# Patient Record
Sex: FEMALE | Race: BLACK OR AFRICAN AMERICAN | Employment: STUDENT | URBAN - METROPOLITAN AREA
[De-identification: names, ages, dates, MRNs, and addresses within clinical notes are randomized per-mention and may not be internally consistent; named-entity substitution may affect disease eponyms.]

---

## 2020-06-27 ENCOUNTER — HOSPITAL ENCOUNTER (EMERGENCY)
Age: 20
Discharge: HOME | End: 2020-06-27
Attending: STUDENT IN AN ORGANIZED HEALTH CARE EDUCATION/TRAINING PROGRAM
Payer: COMMERCIAL

## 2020-06-27 VITALS
HEART RATE: 92 BPM | SYSTOLIC BLOOD PRESSURE: 128 MMHG | RESPIRATION RATE: 18 BRPM | DIASTOLIC BLOOD PRESSURE: 81 MMHG | WEIGHT: 185.41 LBS | OXYGEN SATURATION: 100 % | TEMPERATURE: 98.5 F

## 2020-06-27 DIAGNOSIS — U07.1 COVID-19 VIRUS INFECTION: Primary | ICD-10-CM

## 2020-06-27 PROCEDURE — 99282 EMERGENCY DEPT VISIT SF MDM: CPT

## 2020-06-27 RX ORDER — NORETHINDRONE ACETATE AND ETHINYL ESTRADIOL 1; .02 MG/1; MG/1
1 TABLET ORAL DAILY
COMMUNITY
End: 2020-09-16 | Stop reason: SDUPTHER

## 2020-06-28 NOTE — ED PROVIDER NOTES
Patient is a 60-year-old female presenting to the emergency department today with shortness of breath. She states that she started with symptoms about 10 days ago which included headache, flulike symptoms, loss of taste and smell and fevers. She been using home remedies for treatment. She started with shortness of breath several days ago which is been about the same, may be getting a little bit worse especially with exertion. She was tested for COVID-19 earlier this week and it came back positive. She has not had a fever in 3 days. [facility] felt her blood pressure was rising and her heart rate was dropping (states it was in the 70s). While there they told her that she should have a pulse between 95 and 100 and she decided to come here to get checked out. She states her pulseox was never low. While at rest  she feels well. She has no underlying medical conditions. History reviewed. No pertinent past medical history. Past Surgical History:   Procedure Laterality Date    HX WISDOM TEETH EXTRACTION           History reviewed. No pertinent family history.     Social History     Socioeconomic History    Marital status: SINGLE     Spouse name: Not on file    Number of children: Not on file    Years of education: Not on file    Highest education level: Not on file   Occupational History    Not on file   Social Needs    Financial resource strain: Not on file    Food insecurity     Worry: Not on file     Inability: Not on file    Transportation needs     Medical: Not on file     Non-medical: Not on file   Tobacco Use    Smoking status: Never Smoker    Smokeless tobacco: Never Used   Substance and Sexual Activity    Alcohol use: Never     Frequency: Never    Drug use: Yes     Types: Marijuana     Comment: 1 x per month    Sexual activity: Yes     Partners: Male     Birth control/protection: Pill   Lifestyle    Physical activity     Days per week: Not on file     Minutes per session: Not on file    Stress: Not on file   Relationships    Social connections     Talks on phone: Not on file     Gets together: Not on file     Attends [employer] service: Not on file     Active member of club or organization: Not on file     Attends meetings of clubs or organizations: Not on file     Relationship status: Not on file    Intimate partner violence     Fear of current or ex partner: Not on file     Emotionally abused: Not on file     Physically abused: Not on file     Forced sexual activity: Not on file   Other Topics Concern    Not on file   Social History Narrative    Not on file         ALLERGIES: Patient has no known allergies. Review of Systems   Constitutional: Positive for chills and fever. HENT: Negative for congestion and rhinorrhea. Eyes: Negative for redness and visual disturbance. Respiratory: Positive for cough and shortness of breath. Cardiovascular: Negative for chest pain and leg swelling. Gastrointestinal: Positive for nausea and vomiting. Negative for abdominal pain and diarrhea. Genitourinary: Negative for dysuria, flank pain, frequency, hematuria and urgency. Musculoskeletal: Positive for myalgias. Negative for arthralgias, back pain and neck pain. Skin: Negative for rash and wound. Allergic/Immunologic: Negative for immunocompromised state. Neurological: Positive for headaches. Negative for dizziness. Vitals:    06/27/20 2027 06/27/20 2055   BP: 128/81    Pulse: 92    Resp: 18    Temp: 98.5 °F (36.9 °C)    SpO2: 100% 100%   Weight: 84.1 kg (185 lb 6.5 oz)             Physical Exam  Vitals signs and nursing note reviewed. Constitutional:       General: She is not in acute distress. Appearance: She is well-developed. She is not diaphoretic. HENT:      Head: Normocephalic. Mouth/Throat:      Pharynx: No oropharyngeal exudate. Eyes:      General:         Right eye: No discharge.          Left eye: No discharge. Pupils: Pupils are equal, round, and reactive to light. Neck:      Musculoskeletal: Normal range of motion and neck supple. Cardiovascular:      Rate and Rhythm: Normal rate and regular rhythm. Heart sounds: Normal heart sounds. No murmur. No friction rub. No gallop. Pulmonary:      Effort: Pulmonary effort is normal. No respiratory distress. Breath sounds: Normal breath sounds. No stridor. No wheezing or rales. Abdominal:      General: Bowel sounds are normal. There is no distension. Palpations: Abdomen is soft. Tenderness: There is no abdominal tenderness. There is no guarding or rebound. Musculoskeletal: Normal range of motion. General: No deformity. Skin:     General: Skin is warm and dry. Capillary Refill: Capillary refill takes less than 2 seconds. Findings: No rash. Neurological:      Mental Status: She is alert and oriented to person, place, and time. Psychiatric:         Behavior: Behavior normal.          MDM:  26-year-old female with known COVID-19 [facility] that she should have a pulse between 95 and 100. I suspect that they meant a pulse ox rather than heart rate. Here her pulse ox is 100% on room air. I ambulated the patient with her pulse ox on and it never dropped below 99%. Her heart rate is in the 80s and 90s and she is in no acute distress. Her lungs are clear and she is afebrile here without having taken antipyretics. She is very well-appearing and in no acute distress. I feel she is safe for discharge home and strict return precautions were provided both in writing and verbally. ICD-10-CM ICD-9-CM    1. COVID-19 virus infection U07.1 079.89      DO Porfirio Bonilla Aaron was evaluated in the Emergency Department on 6/27/2020 for the symptoms described in the history of present illness.  He/she was evaluated in the context of the global COVID-19 pandemic, which necessitated consideration that the patient might be at risk for infection with the SARS-CoV-2 virus that causes COVID-19. Institutional protocols and algorithms that pertain to the evaluation of patients at risk for COVID-19 are in a state of rapid change based on information released by regulatory bodies including the CDC and federal and state organizations. These policies and algorithms were followed during the patient's care in the ED.     Surrogate Decision Maker (Who do you want to make decisions for you in the event you are not able to?): Extended Emergency Contact Information  Primary Emergency Contact: [facility] Phone: [phone]  Mobile Phone: [phone]  Relation: Parent

## 2020-06-28 NOTE — ED NOTES
Patient awake, alert, and in no distress. Discharge instructions and education given to patient. Verbalized understanding of discharge instructions. Patient walked out of ED with RN who then provided ED instructions to pt's parents who were waiting outside fore her, pt's oarents verbalized understanding. Alex Cardoso

## 2020-06-28 NOTE — ED TRIAGE NOTES
Triage Note: + COVID results on Thursday, tested on Monday, symptoms started on June 17th, symptoms include migraine, lose of smell, fever up to 100.4, chills, nausea, diarrhea, burning sensation in nose, SOB that began Thursday with it worse today, no meds PTA

## 2020-06-29 ENCOUNTER — PATIENT OUTREACH (OUTPATIENT)
Dept: FAMILY MEDICINE CLINIC | Age: 20
End: 2020-06-29

## 2020-09-01 PROBLEM — Z00.00 ROUTINE GENERAL MEDICAL EXAMINATION AT A HEALTH CARE FACILITY: Status: ACTIVE | Noted: 2020-09-01

## 2020-09-16 ENCOUNTER — OFFICE VISIT (OUTPATIENT)
Dept: FAMILY MEDICINE CLINIC | Age: 20
End: 2020-09-16
Payer: COMMERCIAL

## 2020-09-16 VITALS
TEMPERATURE: 97.8 F | WEIGHT: 194.8 LBS | SYSTOLIC BLOOD PRESSURE: 128 MMHG | RESPIRATION RATE: 18 BRPM | DIASTOLIC BLOOD PRESSURE: 86 MMHG | BODY MASS INDEX: 27.89 KG/M2 | OXYGEN SATURATION: 97 % | HEART RATE: 86 BPM | HEIGHT: 70 IN

## 2020-09-16 DIAGNOSIS — Z00.00 ROUTINE GENERAL MEDICAL EXAMINATION AT A HEALTH CARE FACILITY: Primary | ICD-10-CM

## 2020-09-16 DIAGNOSIS — R06.02 SOB (SHORTNESS OF BREATH): ICD-10-CM

## 2020-09-16 DIAGNOSIS — Z86.16 HISTORY OF 2019 NOVEL CORONAVIRUS DISEASE (COVID-19): ICD-10-CM

## 2020-09-16 DIAGNOSIS — Z30.41: ICD-10-CM

## 2020-09-16 DIAGNOSIS — Z13.220 SCREENING, LIPID: ICD-10-CM

## 2020-09-16 PROCEDURE — 99385 PREV VISIT NEW AGE 18-39: CPT | Performed by: FAMILY MEDICINE

## 2020-09-16 RX ORDER — NORETHINDRONE ACETATE AND ETHINYL ESTRADIOL 1; .02 MG/1; MG/1
1 TABLET ORAL DAILY
Qty: 3 PACKAGE | Refills: 3 | Status: SHIPPED | OUTPATIENT
Start: 2020-09-16 | End: 2020-09-29 | Stop reason: DRUGHIGH

## 2020-09-16 NOTE — PROGRESS NOTES
Chief Complaint   Patient presents with    New Patient     New patient getting established with doctor. 1. Have you been to the ER, urgent care clinic since your last visit? Hospitalized since your last visit? No    2. Have you seen or consulted any other health care providers outside of the [address] [place] [address] since your last visit? Include any pap smears or colon screening.  No

## 2020-09-16 NOTE — PROGRESS NOTES
Subjective:   21 y.o. female for Well Woman Check. Her gyne and breast care is done elsewhere by her Ob-Gyne physician. (Dr. Kelli Barone. Seen by GYN on last month). Hx of COVID19 in June. Had respiratory sx with cough and SOB. Also had fever and chills and muscle pain and nausea. Sx lasted about 1 week. No one else in her family had the infection. Pt this not sure where she had the exposure. Lives with her parent. Pt is an only child. Pt attends [employer]. 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 Patient Active Problem List   Diagnosis Code    Routine general medical examination at a health care facility Z00.00       Current Outpatient Medications   Medication Sig Dispense Refill    norethindrone-ethinyl estradiol (Junel 1/20, 21,) 1-20 mg-mcg tablet Take 1 Tab by mouth daily. 3 Package 3       No Known Allergies    History reviewed. No pertinent past medical history. Past Surgical History:   Procedure Laterality Date    HX WISDOM TEETH EXTRACTION         Family History   Problem Relation Age of Onset    Hypertension Mother     Hypertension Father     Diabetes Paternal Grandmother         pre-diabetes    MS Paternal Grandmother        Social History     Tobacco Use    Smoking status: Never Smoker    Smokeless tobacco: Never Used   Substance Use Topics    Alcohol use: Never     Frequency: Never       ROS: Feeling generally well. No TIA's or unusual headaches, no dysphagia. No prolonged cough. No dyspnea or chest pain on exertion. No abdominal pain, change in bowel habits, black or bloody stools. No urinary tract symptoms. No new or unusual musculoskeletal symptoms. Immunizations UTD per pt. Specific concerns today: still feels like she gets more SOB with exercise since she has had covid19 infection. She does not had cough. No chest pain noted. Objective: The patient appears well, alert, oriented x 3, in no distress.     Visit Vitals  /86   Pulse 86   Temp 97.8 °F (36.6 °C) (Temporal)   Resp 18   Ht 5' 9.5\" (1.765 m)   Wt 194 lb 12.8 oz (88.4 kg)   LMP 08/30/2020   SpO2 97%   BMI 28.35 kg/m²     ENT normal.  Neck supple. No adenopathy or thyromegaly. LORENA. Lungs are clear, good air entry, no wheezes, rhonchi or rales. S1 and S2 normal, no murmurs, regular rate and rhythm. Abdomen soft without tenderness, guarding, mass or organomegaly. Extremities show no edema, normal peripheral pulses. Neurological is normal, no focal findings. Breast and Pelvic exams are deferred. Assessment/Plan:   Well Woman  routine labs ordered, call if any problems  Diagnoses and all orders for this visit:    1. Routine general medical examination at a health care facility  Pt will provide records for her immunization.    -     CBC W/O DIFF  -     METABOLIC PANEL, BASIC    2. History of 2019 novel coronavirus disease (COVID-19)  -     XR CHEST PA LAT; Future  -     SARS-COV-2 AB, TOTAL    3. SOB (shortness of breath)  -     XR CHEST PA LAT; Future    4. Screening, lipid  -     LIPID PANEL    5. Provided repeat prescription for oral contraceptive  -     norethindrone-ethinyl estradiol (Junel 1/20, 21,) 1-20 mg-mcg tablet; Take 1 Tab by mouth daily. reviewed diet, exercise and weight control  cardiovascular risk and specific lipid/LDL goals reviewed    I have discussed diagnosis listed in this note with pt and/or family. I have discussed treatment plans and options and the risk/benefit analysis of those options, including safe use of medications and possible medication side effects. Through the use of shared decision making we have agreed to the above plan. The patient has received an after-visit summary and questions were answered concerning future plans and follow up. Advise pt of any urgent changes then to proceed to the ER.

## 2020-09-16 NOTE — PATIENT INSTRUCTIONS
COVID-19 Antibody Test: About This Test 
What is it? An antibody test looks for antibodies in the blood. These are proteins that your immune system makes, usually after you're exposed to germs like viruses or bacteria or after you get a vaccine. Antibodies work to fight illness. A COVID-19 antibody test looks for antibodies to SARS-CoV-2, the virus that causes COVID-19. If you test positive for these antibodies, it could mean that you already had COVID-19. Why is it done? This test can be used to diagnose a past infection with the virus that causes COVID-19. Many people who get COVID-19 never have symptoms or have only mild ones. Without antibody testing, these people might never know that they already had the virus. Antibody testing is important because: 
· It could show who has already had COVID-19. These people might be safe to go back to work or school or to travel. · It could show who hasn't had the infection. These people are still at risk. They need to keep taking steps to avoid the virus. · It helps experts who are tracking COVID-19 learn more about the virus and how it spreads. How do you prepare for the test? 
You don't need to do anything to prepare for this test. But be sure to follow any instructions your health care provider gives you. How is it done? This is a blood test. A health professional may prick your finger or use a needle to take a sample of blood from your arm. What do your results mean? The result is either positive or negative. A positive result means antibodies to SARS-CoV-2 were found. You probably already had COVID-19. But: 
· You could get a \"false-positive\" result. The test might show that you have COVID-19 antibodies when you don't. The test may find antibodies that formed in response to another type of coronavirus.  If this happens, you're still at risk for DEXVF-84. 
· It's not certain that having these antibodies will protect you from getting COVID-19 again. And if it does, it's not clear how long the protection lasts. A negative result means that these antibodies were not found. You probably haven't had COVID-19. But: 
· You could get a \"false-negative\" result. It takes a while after you're infected for your immune system to make antibodies. You could have a negative result but be infected now. You'd need a different test (viral test) to know if you have COVID-19 now. Current as of: July 10, 2020               Content Version: 12.6 © 2006-2020 [employer]. Care instructions adapted under license by [employer] (which disclaims liability or warranty for this information). If you have questions about a medical condition or this instruction, always ask your healthcare professional. [address] any warranty or liability for your use of this information.

## 2020-09-17 LAB
BUN SERPL-MCNC: 8 MG/DL (ref 6–20)
BUN/CREAT SERPL: 8 (ref 9–23)
CALCIUM SERPL-MCNC: 9.5 MG/DL (ref 8.7–10.2)
CHLORIDE SERPL-SCNC: 101 MMOL/L (ref 96–106)
CHOLEST SERPL-MCNC: 182 MG/DL (ref 100–199)
CO2 SERPL-SCNC: 22 MMOL/L (ref 20–29)
CREAT SERPL-MCNC: 0.97 MG/DL (ref 0.57–1)
ERYTHROCYTE [DISTWIDTH] IN BLOOD BY AUTOMATED COUNT: 13 % (ref 11.7–15.4)
GLUCOSE SERPL-MCNC: 95 MG/DL (ref 65–99)
HCT VFR BLD AUTO: 40.5 % (ref 34–46.6)
HDLC SERPL-MCNC: 76 MG/DL
HGB BLD-MCNC: 13.4 G/DL (ref 11.1–15.9)
INTERPRETATION, 910389: NORMAL
LDLC SERPL CALC-MCNC: 90 MG/DL (ref 0–99)
MCH RBC QN AUTO: 28.8 PG (ref 26.6–33)
MCHC RBC AUTO-ENTMCNC: 33.1 G/DL (ref 31.5–35.7)
MCV RBC AUTO: 87 FL (ref 79–97)
PLATELET # BLD AUTO: 331 X10E3/UL (ref 150–450)
POTASSIUM SERPL-SCNC: 4.4 MMOL/L (ref 3.5–5.2)
RBC # BLD AUTO: 4.66 X10E6/UL (ref 3.77–5.28)
SARS-COV-2 ABS, NUCLEOCAPSID: POSITIVE
SODIUM SERPL-SCNC: 138 MMOL/L (ref 134–144)
TRIGL SERPL-MCNC: 90 MG/DL (ref 0–149)
VLDLC SERPL CALC-MCNC: 16 MG/DL (ref 5–40)
WBC # BLD AUTO: 4.5 X10E3/UL (ref 3.4–10.8)

## 2020-09-29 RX ORDER — NORETHINDRONE ACETATE AND ETHINYL ESTRADIOL .03; 1.5 MG/1; MG/1
TABLET ORAL
Qty: 3 PACKAGE | Refills: 3 | Status: SHIPPED | OUTPATIENT
Start: 2020-09-29 | End: 2021-10-25

## 2020-09-29 NOTE — TELEPHONE ENCOUNTER
Patient wants return call regarding the medication norethindrone-ethinyl estradiol (Junel 1/20, 21,) 1-20 mg-mcg tablet, she said it is the wrong dose.   Please give her a call @ [phone]

## 2020-10-01 PROBLEM — Z00.00 ROUTINE GENERAL MEDICAL EXAMINATION AT A HEALTH CARE FACILITY: Status: RESOLVED | Noted: 2020-09-01 | Resolved: 2020-10-01

## 2020-10-08 NOTE — TELEPHONE ENCOUNTER
----- Message from Rafael Cobia sent at 10/8/2020  2:08 PM EDT -----  Regarding: Dr. Juan Vallejo / Claudine Camacho (if not patient): self    Relationship of caller (if not patient): self    Best contact number(s): [phone]    Name of medication and dosage if known: Junel 1.5mg    Is patient out of this medication (yes/no): No    Pharmacy name: [facility]    Pharmacy listed in chart? (yes/no): Yes    Pharmacy phone number: [phone]    Date of last visit: 09/16/20    Details to clarify the request: Prescription must be for 90 day supply in order for her insurance to cover.   Please re-submit refill request.

## 2021-10-25 RX ORDER — NORETHINDRONE ACETATE AND ETHINYL ESTRADIOL .03; 1.5 MG/1; MG/1
TABLET ORAL
Qty: 1 DOSE PACK | Refills: 5 | Status: SHIPPED | OUTPATIENT
Start: 2021-10-25 | End: 2022-03-28 | Stop reason: SDUPTHER

## 2021-12-22 ENCOUNTER — OFFICE VISIT (OUTPATIENT)
Dept: FAMILY MEDICINE CLINIC | Age: 21
End: 2021-12-22
Payer: COMMERCIAL

## 2021-12-22 VITALS
TEMPERATURE: 98.7 F | BODY MASS INDEX: 31.22 KG/M2 | RESPIRATION RATE: 12 BRPM | HEART RATE: 92 BPM | SYSTOLIC BLOOD PRESSURE: 133 MMHG | HEIGHT: 69 IN | WEIGHT: 210.8 LBS | DIASTOLIC BLOOD PRESSURE: 79 MMHG | OXYGEN SATURATION: 100 %

## 2021-12-22 DIAGNOSIS — Z11.59 NEED FOR HEPATITIS C SCREENING TEST: ICD-10-CM

## 2021-12-22 DIAGNOSIS — Z00.00 ROUTINE GENERAL MEDICAL EXAMINATION AT A HEALTH CARE FACILITY: Primary | ICD-10-CM

## 2021-12-22 DIAGNOSIS — E66.9 NON MORBID OBESITY: ICD-10-CM

## 2021-12-22 PROCEDURE — 99395 PREV VISIT EST AGE 18-39: CPT | Performed by: FAMILY MEDICINE

## 2021-12-22 RX ORDER — DAPSONE 75 MG/G
GEL TOPICAL AS NEEDED
COMMUNITY
Start: 2021-09-16

## 2021-12-22 RX ORDER — TRIFAROTENE 50 UG/G
CREAM TOPICAL AS NEEDED
COMMUNITY
Start: 2021-09-16

## 2021-12-22 NOTE — PROGRESS NOTES
Subjective:   24 y.o. female for Well Woman Check. Her gyne and breast care is done elsewhere by her Ob-Gyne physician. (Dr. Jamie Pierson). Seen by GYN on this month. Hx of COVID19 in June 2020. She is vaccinated and planning to get her booster. Pt is an only child. Pt graduated from [employer]. 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 Patient Active Problem List   Diagnosis Code   (none) - all problems resolved or deleted       Current Outpatient Medications   Medication Sig Dispense Refill    Aklief 0.005 % crea as needed.  dapsone 7.5 % glwp as needed.  norethindrone ac-eth estradioL (Junel 1.5/30, 21,) 1.5-30 mg-mcg tab TAKE 1 TABLET BY MOUTH EVERY DAY 1 Dose Pack 5       No Known Allergies    History reviewed. No pertinent past medical history. Past Surgical History:   Procedure Laterality Date    HX WISDOM TEETH EXTRACTION           Family History   Problem Relation Age of Onset    Hypertension Mother     Hypertension Father     Diabetes Paternal Grandmother         pre-diabetes    Mult Sclerosis Paternal Grandmother        Social History     Tobacco Use    Smoking status: Never Smoker    Smokeless tobacco: Never Used   Substance Use Topics    Alcohol use: Never       ROS: Feeling generally well. No TIA's or unusual headaches, no dysphagia. No prolonged cough. No dyspnea or chest pain on exertion. No abdominal pain, change in bowel habits, black or bloody stools. No urinary tract symptoms. No new or unusual musculoskeletal symptoms. Immunizations UTD per pt. Specific concerns today: Concerned about her weight, eats late at night and eats mostly carbs. Stress eating with school. Objective: The patient appears well, alert, oriented x 3, in no distress.     Visit Vitals  /79 (BP 1 Location: Right arm, BP Patient Position: Sitting, BP Cuff Size: Adult)   Pulse 92   Temp 98.7 °F (37.1 °C) (Oral)   Resp 12   Ht 5' 9\" (1.753 m)   Wt 210 lb 12.8 oz (95.6 kg)   SpO2 100%   BMI 31.13 kg/m²     ENT normal.  Neck supple. No adenopathy or thyromegaly. LORENA. Lungs are clear, good air entry, no wheezes, rhonchi or rales. S1 and S2 normal, no murmurs, regular rate and rhythm. Abdomen soft without tenderness, guarding, mass or organomegaly. Extremities show no edema, normal peripheral pulses. Neurological is normal, no focal findings. Breast and Pelvic exams are deferred. Assessment/Plan:   Well Woman  routine labs ordered, call if any problems  Diagnoses and all orders for this visit:    ASSESSMENT and PLAN  Diagnoses and all orders for this visit:    1. Routine general medical examination at a health care facility  -     METABOLIC PANEL, COMPREHENSIVE; Future  -     CBC W/O DIFF; Future    2. Non morbid obesity  -     TSH 3RD GENERATION; Future  Discussed weight loss options, diet and exercise. Also reviewed health issues related to obesity. Initial goal of weight loss 10% of current weight. Counseled on goal for exercise of eventual goal of 30-60 minutes 5-7 times a week as per AHA guidelines. Decrease carbohydrates (white foods, sweet foods, sweet drinks and alcohol), increase green leafy vegetables and protein (lean meats and beans). Avoid fried foods. Increase water intake. Eat 3-5 small meals daily. Increase physical activity. 3. Need for hepatitis C screening test  -     HEPATITIS C AB; Future      reviewed diet, exercise and weight control  cardiovascular risk and specific lipid/LDL goals reviewed    I have discussed diagnosis listed in this note with pt and/or family. I have discussed treatment plans and options and the risk/benefit analysis of those options, including safe use of medications and possible medication side effects. Through the use of shared decision making we have agreed to the above plan.  The patient has received an after-visit summary and questions were answered concerning future plans and follow up.  Advise pt of any urgent changes then to proceed to the ER.

## 2021-12-22 NOTE — PROGRESS NOTES
Chief Complaint   Patient presents with    Complete Physical       1. Have you been to the ER, urgent care clinic since your last visit? Hospitalized since your last visit? No    2. Have you seen or consulted any other health care providers outside of the [address] [place] [address] since your last visit? Include any pap smears or colon screening. No     PAP - 8/17/20 Medical Release Signed.       Health Maintenance Due   Topic Date Due    Hepatitis C Screening  Never done    Pap Smear  Never done    Flu Vaccine (1) Never done    COVID-19 Vaccine (3 - Booster for Moderna series) 10/28/2021

## 2021-12-23 LAB
ALBUMIN SERPL-MCNC: 3.8 G/DL (ref 3.5–5)
ALBUMIN/GLOB SERPL: 1.2 {RATIO} (ref 1.1–2.2)
ALP SERPL-CCNC: 52 U/L (ref 45–117)
ALT SERPL-CCNC: 17 U/L (ref 12–78)
ANION GAP SERPL CALC-SCNC: 7 MMOL/L (ref 5–15)
AST SERPL-CCNC: 11 U/L (ref 15–37)
BILIRUB SERPL-MCNC: 0.3 MG/DL (ref 0.2–1)
BUN SERPL-MCNC: 7 MG/DL (ref 6–20)
BUN/CREAT SERPL: 9 (ref 12–20)
CALCIUM SERPL-MCNC: 8.8 MG/DL (ref 8.5–10.1)
CHLORIDE SERPL-SCNC: 107 MMOL/L (ref 97–108)
CO2 SERPL-SCNC: 23 MMOL/L (ref 21–32)
CREAT SERPL-MCNC: 0.81 MG/DL (ref 0.55–1.02)
ERYTHROCYTE [DISTWIDTH] IN BLOOD BY AUTOMATED COUNT: 12.9 % (ref 11.5–14.5)
GLOBULIN SER CALC-MCNC: 3.2 G/DL (ref 2–4)
GLUCOSE SERPL-MCNC: 68 MG/DL (ref 65–100)
HCT VFR BLD AUTO: 39.4 % (ref 35–47)
HCV AB SERPL QL IA: NONREACTIVE
HGB BLD-MCNC: 12.8 G/DL (ref 11.5–16)
MCH RBC QN AUTO: 28.6 PG (ref 26–34)
MCHC RBC AUTO-ENTMCNC: 32.5 G/DL (ref 30–36.5)
MCV RBC AUTO: 88.1 FL (ref 80–99)
NRBC # BLD: 0 K/UL (ref 0–0.01)
NRBC BLD-RTO: 0 PER 100 WBC
PLATELET # BLD AUTO: 331 K/UL (ref 150–400)
PMV BLD AUTO: 11.8 FL (ref 8.9–12.9)
POTASSIUM SERPL-SCNC: 4.3 MMOL/L (ref 3.5–5.1)
PROT SERPL-MCNC: 7 G/DL (ref 6.4–8.2)
RBC # BLD AUTO: 4.47 M/UL (ref 3.8–5.2)
SODIUM SERPL-SCNC: 137 MMOL/L (ref 136–145)
TSH SERPL DL<=0.05 MIU/L-ACNC: 1.09 UIU/ML (ref 0.36–3.74)
WBC # BLD AUTO: 5 K/UL (ref 3.6–11)

## 2023-03-08 ENCOUNTER — OFFICE VISIT (OUTPATIENT)
Dept: FAMILY MEDICINE CLINIC | Age: 23
End: 2023-03-08
Payer: COMMERCIAL

## 2023-03-08 VITALS
HEART RATE: 92 BPM | RESPIRATION RATE: 18 BRPM | HEIGHT: 69 IN | SYSTOLIC BLOOD PRESSURE: 124 MMHG | TEMPERATURE: 98.8 F | BODY MASS INDEX: 35.4 KG/M2 | OXYGEN SATURATION: 99 % | DIASTOLIC BLOOD PRESSURE: 84 MMHG | WEIGHT: 239 LBS

## 2023-03-08 DIAGNOSIS — Z00.00 ROUTINE GENERAL MEDICAL EXAMINATION AT A HEALTH CARE FACILITY: Primary | ICD-10-CM

## 2023-03-08 DIAGNOSIS — L70.0 ACNE VULGARIS: ICD-10-CM

## 2023-03-08 DIAGNOSIS — R63.5 WEIGHT GAIN: ICD-10-CM

## 2023-03-08 DIAGNOSIS — Z13.220 SCREENING, LIPID: ICD-10-CM

## 2023-03-08 DIAGNOSIS — E66.9 NON MORBID OBESITY: ICD-10-CM

## 2023-03-08 PROCEDURE — 99395 PREV VISIT EST AGE 18-39: CPT | Performed by: FAMILY MEDICINE

## 2023-03-08 RX ORDER — TRIFAROTENE 50 UG/G
45 CREAM TOPICAL AS NEEDED
Qty: 1 EACH | Refills: 3 | Status: SHIPPED | OUTPATIENT
Start: 2023-03-08

## 2023-03-08 RX ORDER — ETONOGESTREL AND ETHINYL ESTRADIOL .12; .015 MG/D; MG/D
INSERT, EXTENDED RELEASE VAGINAL
COMMUNITY
Start: 2023-03-07 | End: 2023-03-08 | Stop reason: ALTCHOICE

## 2023-03-08 RX ORDER — DAPSONE 75 MG/G
90 GEL TOPICAL AS NEEDED
Qty: 1 EACH | Refills: 3 | Status: SHIPPED | OUTPATIENT
Start: 2023-03-08

## 2023-03-08 NOTE — PROGRESS NOTES
Subjective:   25 y.o. female for Well Woman Check. Her gyne and breast care is done elsewhere by her Ob-Gyne physician. Tara Garcia Page). Seen by GYN on last month. Hx of COVID19 in June 2020. She is vaccinated and planning to get her booster. Pt is an only child. Pt graduated from [employer]. 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 Patient Active Problem List   Diagnosis Code   (none) - all problems resolved or deleted       Current Outpatient Medications   Medication Sig Dispense Refill    norethindrone ac-eth estradioL (Junel 1.5/30, 21,) 1.5-30 mg-mcg tab TAKE 1 TABLET BY MOUTH EVERY DAY 3 Dose Pack 3    Aklief 0.005 % crea as needed. dapsone 7.5 % glwp as needed. No Known Allergies    No past medical history on file. Past Surgical History:   Procedure Laterality Date    HX WISDOM TEETH EXTRACTION             Family History   Problem Relation Age of Onset    Hypertension Mother     Hypertension Father     Diabetes Paternal Grandmother         pre-diabetes    Mult Sclerosis Paternal Grandmother        Social History     Tobacco Use    Smoking status: Never    Smokeless tobacco: Never   Substance Use Topics    Alcohol use: Never       ROS: Feeling generally well. No TIA's or unusual headaches, no dysphagia. No prolonged cough. No dyspnea or chest pain on exertion. No abdominal pain, change in bowel habits, black or bloody stools. No urinary tract symptoms. No new or unusual musculoskeletal symptoms. Immunizations UTD per pt. Specific concerns today: Concerned about her weight, eats late at night and eats mostly carbs. Stress eating with school. Discussed and weight reduction. Objective: The patient appears well, alert, oriented x 3, in no distress.     Visit Vitals  /84   Pulse 92   Temp 98.8 °F (37.1 °C) (Oral)   Resp 18   Ht 5' 9\" (1.753 m)   Wt 239 lb (108.4 kg)   LMP 02/08/2023   SpO2 99%   BMI 35.29 kg/m²     ENT normal.  Neck supple. No adenopathy or thyromegaly. LORENA. Lungs are clear, good air entry, no wheezes, rhonchi or rales. S1 and S2 normal, no murmurs, regular rate and rhythm. Abdomen soft without tenderness, guarding, mass or organomegaly. Extremities show no edema, normal peripheral pulses. Neurological is normal, no focal findings. Breast and Pelvic exams are deferred. Assessment/Plan:   Well Woman  routine labs ordered, call if any problems  Diagnoses and all orders for this visit:      ASSESSMENT and PLAN  Diagnoses and all orders for this visit:    1. Routine general medical examination at a health care facility  -     METABOLIC PANEL, BASIC; Future  -     CBC W/O DIFF; Future    2. Weight gain  -     TSH 3RD GENERATION; Future  -     HEMOGLOBIN A1C WITH EAG; Future    3. Screening, lipid  -     LIPID PANEL; Future    4. Non morbid obesity  I have reviewed/discussed the above normal BMI with the patient. I have recommended the following interventions: dietary management education, guidance, and counseling    Discussed weight loss options, diet and exercise. Also reviewed health issues related to obesity. Initial goal of weight loss 10% of current weight. Counseled on goal for exercise of eventual goal of 30-60 minutes 5-7 times a week as per AHA guidelines. Decrease carbohydrates (white foods, sweet foods, sweet drinks and alcohol), increase green leafy vegetables and protein (lean meats and beans). Avoid fried foods. Increase water intake. Eat 3-5 small meals daily. Increase physical activity. 5. Acne vulgaris  -     Aklief 0.005 % crea; Apply 45 g to affected area as needed for PRN Reason (Other) (ACNE). -     dapsone 7.5 % glwp; Apply 90 g to affected area as needed for PRN Reason (Other) (acne). Follow-up and Dispositions    Return in about 6 months (around 9/8/2023).        reviewed diet, exercise and weight control  cardiovascular risk and specific lipid/LDL goals reviewed  reviewed medications and side effects in detail    I have discussed diagnosis listed in this note with pt and/or family. I have discussed treatment plans and options and the risk/benefit analysis of those options, including safe use of medications and possible medication side effects. Through the use of shared decision making we have agreed to the above plan. The patient has received an after-visit summary and questions were answered concerning future plans and follow up. Advise pt of any urgent changes then to proceed to the ER.

## 2023-03-09 LAB
ANION GAP SERPL CALC-SCNC: 5 MMOL/L (ref 5–15)
BUN SERPL-MCNC: 6 MG/DL (ref 6–20)
BUN/CREAT SERPL: 8 (ref 12–20)
CALCIUM SERPL-MCNC: 9 MG/DL (ref 8.5–10.1)
CHLORIDE SERPL-SCNC: 108 MMOL/L (ref 97–108)
CHOLEST SERPL-MCNC: 172 MG/DL
CO2 SERPL-SCNC: 26 MMOL/L (ref 21–32)
CREAT SERPL-MCNC: 0.78 MG/DL (ref 0.55–1.02)
ERYTHROCYTE [DISTWIDTH] IN BLOOD BY AUTOMATED COUNT: 13 % (ref 11.5–14.5)
EST. AVERAGE GLUCOSE BLD GHB EST-MCNC: 108 MG/DL
GLUCOSE SERPL-MCNC: 85 MG/DL (ref 65–100)
HBA1C MFR BLD: 5.4 % (ref 4–5.6)
HCT VFR BLD AUTO: 38.5 % (ref 35–47)
HDLC SERPL-MCNC: 68 MG/DL
HDLC SERPL: 2.5 (ref 0–5)
HGB BLD-MCNC: 12.1 G/DL (ref 11.5–16)
LDLC SERPL CALC-MCNC: 77 MG/DL (ref 0–100)
MCH RBC QN AUTO: 27.6 PG (ref 26–34)
MCHC RBC AUTO-ENTMCNC: 31.4 G/DL (ref 30–36.5)
MCV RBC AUTO: 87.9 FL (ref 80–99)
NRBC # BLD: 0 K/UL (ref 0–0.01)
NRBC BLD-RTO: 0 PER 100 WBC
PLATELET # BLD AUTO: 332 K/UL (ref 150–400)
PMV BLD AUTO: 11.9 FL (ref 8.9–12.9)
POTASSIUM SERPL-SCNC: 4.1 MMOL/L (ref 3.5–5.1)
RBC # BLD AUTO: 4.38 M/UL (ref 3.8–5.2)
SODIUM SERPL-SCNC: 139 MMOL/L (ref 136–145)
TRIGL SERPL-MCNC: 135 MG/DL (ref ?–150)
TSH SERPL DL<=0.05 MIU/L-ACNC: 0.93 UIU/ML (ref 0.36–3.74)
VLDLC SERPL CALC-MCNC: 27 MG/DL
WBC # BLD AUTO: 5.6 K/UL (ref 3.6–11)

## 2023-03-10 ENCOUNTER — TELEPHONE (OUTPATIENT)
Dept: FAMILY MEDICINE CLINIC | Age: 23
End: 2023-03-10

## 2023-03-10 NOTE — TELEPHONE ENCOUNTER
Barbara agrawal/ [facility] called requesting a call back from the nurse in regards to needing clarity on a few medications. He can be reached at [phone].

## 2023-03-16 RX ORDER — NORETHINDRONE ACETATE AND ETHINYL ESTRADIOL .03; 1.5 MG/1; MG/1
TABLET ORAL
Qty: 1 DOSE PACK | Refills: 4 | Status: SHIPPED | OUTPATIENT
Start: 2023-03-16

## 2023-05-18 RX ORDER — NORETHINDRONE ACETATE AND ETHINYL ESTRADIOL .03; 1.5 MG/1; MG/1
1 TABLET ORAL DAILY
Qty: 84 TABLET | Refills: 3 | Status: SHIPPED | OUTPATIENT
Start: 2023-05-18

## 2024-06-07 ENCOUNTER — OFFICE VISIT (OUTPATIENT)
Age: 24
End: 2024-06-07
Payer: COMMERCIAL

## 2024-06-07 VITALS — BODY MASS INDEX: 35.29 KG/M2 | HEIGHT: 69 IN

## 2024-06-07 DIAGNOSIS — R63.5 WEIGHT GAIN: ICD-10-CM

## 2024-06-07 DIAGNOSIS — R06.83 SNORING: ICD-10-CM

## 2024-06-07 DIAGNOSIS — L70.9 ACNE, UNSPECIFIED ACNE TYPE: Primary | ICD-10-CM

## 2024-06-07 PROCEDURE — 99204 OFFICE O/P NEW MOD 45 MIN: CPT | Performed by: INTERNAL MEDICINE

## 2024-06-07 NOTE — PROGRESS NOTES
Chief Complaint   Patient presents with    Abnormal Hormonal Screen       * New Patient Visit     General:  Always had a lot of hormonal acne  On BCP for a few years - has helped cycles and acne   Hard to lose weight    Saw GYN - just talked about change in diet; never mentioned PCOS    Periods age 12 - they were regular but heavy so why started BCP (also acne then)  Sr in HS '18 did US of ovaries - no cyst   Some excess hair under arms/legs/pubic area - no on face or back     Does snore - no eval for LISA     Has seen Derm - gives Ab to calm inflammation outside; but she is worried about internal issues     Labs/Studies    Lab Results   Component Value Date/Time    TSH 0.93 03/08/2023 03:15 PM    TSH 1.09 12/22/2021 03:18 PM         No past medical history on file.     Past Surgical History:   Procedure Laterality Date    WISDOM TOOTH EXTRACTION        Height 1.753 m (5' 9\").         3/8/2023     2:28 PM 12/22/2021     2:24 PM 6/27/2020     8:27 PM   Weight Metrics   Weight 239 lb 210 lb 12.8 oz 185 lb 6.5 oz   BMI (Calculated) 35.4 kg/m2 31.2 kg/m2 0 kg/m2        EXAM:  - not done today     Assessment/Plan:   1. Acne, unspecified acne type  She feels it is hormonal and did improve when put on BCP as teen  Still on BCP and sees Derm for treatment  Not sure I will be helpful or what hormones would be contributing  Has none of the 3 criteria for PCOS  Check for pre-DM/DM, thyroid, test/dhea-s, LNSC    2. Weight gain  3. Snoring  See above and refer to sleep medicine to r/o LISA    No orders of the defined types were placed in this encounter.     Orders Placed This Encounter   Procedures    Testosterone, F, Eq. Filt with T. Testos     [id number] - Testosterone, Free, Equilibrium Ultrafiltration With Total Testosterone, LC/MS-MS    DHEA-Sulfate    TSH     LAB SIM [id number] -3rd Generation TSH    T4, Free    Insulin-Like Growth Factor    Salivary Cortisol    Hemoglobin A1C    Ambulatory referral to Sleep Medicine

## 2024-06-08 LAB
DHEA-S SERPL-MCNC: 247 UG/DL (ref 110–431.7)
HBA1C MFR BLD: 5.6 % (ref 4.8–5.6)
T4 FREE SERPL-MCNC: 1.49 NG/DL (ref 0.82–1.77)
TSH SERPL DL<=0.005 MIU/L-ACNC: 1.88 UIU/ML (ref 0.45–4.5)

## 2024-06-09 LAB — IGF-I SERPL-MCNC: 234 NG/ML (ref 101–347)

## 2024-06-12 ENCOUNTER — TELEPHONE (OUTPATIENT)
Age: 24
End: 2024-06-12

## 2024-06-15 LAB
TESTOST FREE MFR SERPL: 1.08 % (ref 0.5–2.8)
TESTOST FREE SERPL-MCNC: 0.19 NG/DL (ref 0.1–0.85)
TESTOST SERPL-MCNC: 17.9 NG/DL (ref 10–55)

## 2024-06-17 LAB — CORTIS SAL-MCNC: 0.02 UG/DL

## 2024-07-24 RX ORDER — NORETHINDRONE ACETATE AND ETHINYL ESTRADIOL 1.5; 3 MG/1; UG/1
1 TABLET ORAL DAILY
Qty: 84 TABLET | Refills: 0 | Status: SHIPPED | OUTPATIENT
Start: 2024-07-24

## 2024-07-24 NOTE — TELEPHONE ENCOUNTER
Last appointment: 3/8/23  Next appointment: 8/26/24  Previous refill encounter(s): 12/18/23 #28 with 5 refills    Requested Prescriptions     Pending Prescriptions Disp Refills    JUNEL 1.5/30 1.5-30 MG-MCG TABS [Pharmacy Med Name: JUNEL 1.5 MG-30 MCG TABLET] 84 tablet 3     Sig: TAKE 1 TABLET BY MOUTH EVERY DAY         For Pharmacy Admin Tracking Only    Program: Medication Refill  CPA in place:    Recommendation Provided To:   Intervention Detail: New Rx: 1, reason: Patient Preference  Intervention Accepted By:   Gap Closed?:    Time Spent (min): 5

## 2024-11-07 NOTE — TELEPHONE ENCOUNTER
Last appointment: 3/8/23  Next appointment: none  Previous refill encounter(s): 7/24/24 #84    Requested Prescriptions     Pending Prescriptions Disp Refills    Norethindrone Acet-Ethinyl Est (JUNEL 1.5/30) 1.5-30 MG-MCG TABS [Pharmacy Med Name: JUNEL 1.5 MG-30 MCG TABLET] 84 tablet 0     Sig: TAKE 1 TABLET BY MOUTH EVERY DAY         For Pharmacy Admin Tracking Only    Program: Medication Refill  CPA in place:    Recommendation Provided To:   Intervention Detail: New Rx: 1, reason: Patient Preference  Intervention Accepted By:   Gap Closed?:    Time Spent (min): 5

## 2024-11-08 RX ORDER — NORETHINDRONE ACETATE AND ETHINYL ESTRADIOL .03; 1.5 MG/1; MG/1
1 TABLET ORAL DAILY
Qty: 84 TABLET | Refills: 0 | Status: SHIPPED | OUTPATIENT
Start: 2024-11-08